# Patient Record
Sex: FEMALE | Race: WHITE | NOT HISPANIC OR LATINO | Employment: FULL TIME | URBAN - METROPOLITAN AREA
[De-identification: names, ages, dates, MRNs, and addresses within clinical notes are randomized per-mention and may not be internally consistent; named-entity substitution may affect disease eponyms.]

---

## 2021-01-13 ENCOUNTER — OFFICE VISIT (OUTPATIENT)
Dept: NEUROSURGERY | Facility: CLINIC | Age: 26
End: 2021-01-13

## 2021-01-13 VITALS
WEIGHT: 231.52 LBS | BODY MASS INDEX: 38.57 KG/M2 | SYSTOLIC BLOOD PRESSURE: 116 MMHG | DIASTOLIC BLOOD PRESSURE: 76 MMHG | TEMPERATURE: 97.1 F | HEIGHT: 65 IN

## 2021-01-13 DIAGNOSIS — M51.36 DISC DEGENERATION, LUMBAR: Primary | ICD-10-CM

## 2021-01-13 DIAGNOSIS — M54.9 MECHANICAL BACK PAIN: ICD-10-CM

## 2021-01-13 PROCEDURE — 99203 OFFICE O/P NEW LOW 30 MIN: CPT | Performed by: PHYSICIAN ASSISTANT

## 2021-01-13 RX ORDER — SPIRONOLACTONE 100 MG/1
100 TABLET, FILM COATED ORAL DAILY
COMMUNITY

## 2021-01-13 RX ORDER — PRENATAL VIT NO.126/IRON/FOLIC 28MG-0.8MG
TABLET ORAL DAILY
COMMUNITY

## 2021-01-13 NOTE — PROGRESS NOTES
Patient: Henny Carroll  : 1995  Chart #: [id number]    Date of Service: 2021    CHIEF COMPLAINT: Low back pain    History of Present Illness Ms. Carroll is a 25-year-old  who is new to our clinic.  She describes a couple year history of on and off low back pain.  In September of last year symptoms came on and have become more constant and bothersome.  Pain is confined to the low back and sometimes radiates off to the left side.  She denies radicular type complaints.  She was told that she had a fractured vertebral bone at one point that has now healed.  Nothing she can think of makes symptoms better or worse.  She has had no formal therapies.  Occasionally she takes over-the-counter analgesics.  No bowel or bladder difficulties.  She was previously evaluated by an orthopedic provider for her low back difficulties and that did not go over very well.      Past Medical History:   Diagnosis Date   • Anemia    • Bladder infection    • Headache    • Low back pain          Current Outpatient Medications:   •  AZO CRANBERRY GUMMIES PO, Take  by mouth., Disp: , Rfl:   •  prenatal vitamin (prenatal, CLASSIC, vitamin) tablet, Take  by mouth Daily., Disp: , Rfl:   •  spironolactone (ALDACTONE) 100 MG tablet, Take 100 mg by mouth Daily., Disp: , Rfl:     Past Surgical History:   Procedure Laterality Date   • ARM LACERATION REPAIR Right    • CYST REMOVAL         Social History     Socioeconomic History   • Marital status: Single     Spouse name: Not on file   • Number of children: Not on file   • Years of education: Not on file   • Highest education level: Not on file   Tobacco Use   • Smoking status: Never Smoker   • Smokeless tobacco: Never Used   Substance and Sexual Activity   • Alcohol use: Yes     Alcohol/week: 1.0 standard drinks     Types: 1 Glasses of wine per week     Comment: 1 drink every other week   • Drug use: Never   • Sexual activity: Defer         Review of Systems   Constitutional:  Positive for activity change, fatigue and unexpected weight change. Negative for appetite change, chills, diaphoresis and fever.   HENT: Negative for congestion, dental problem, drooling, ear discharge, ear pain, facial swelling, hearing loss, mouth sores, nosebleeds, postnasal drip, rhinorrhea, sinus pressure, sneezing, sore throat, tinnitus, trouble swallowing and voice change.    Eyes: Negative for photophobia, pain, discharge, redness, itching and visual disturbance.   Respiratory: Positive for apnea. Negative for cough, choking, chest tightness, shortness of breath, wheezing and stridor.    Cardiovascular: Negative for chest pain, palpitations and leg swelling.   Gastrointestinal: Positive for constipation. Negative for abdominal distention, abdominal pain, anal bleeding, blood in stool, diarrhea, nausea, rectal pain and vomiting.   Endocrine: Negative for cold intolerance, heat intolerance, polydipsia, polyphagia and polyuria.   Genitourinary: Negative for decreased urine volume, difficulty urinating, dysuria, enuresis, flank pain, frequency, genital sores, hematuria and urgency.   Musculoskeletal: Positive for back pain. Negative for arthralgias, gait problem, joint swelling, myalgias, neck pain and neck stiffness.   Skin: Negative for color change, pallor, rash and wound.   Allergic/Immunologic: Negative for environmental allergies, food allergies and immunocompromised state.   Neurological: Positive for headaches. Negative for dizziness, tremors, seizures, syncope, facial asymmetry, speech difficulty, weakness, light-headedness and numbness.   Hematological: Negative for adenopathy. Does not bruise/bleed easily.   Psychiatric/Behavioral: Negative for agitation, behavioral problems, confusion, decreased concentration, dysphoric mood, hallucinations, self-injury, sleep disturbance and suicidal ideas. The patient is not nervous/anxious and is not hyperactive.    All other systems reviewed and are  "negative.      Objective   Vital Signs: Blood pressure 116/76, temperature 97.1 °F (36.2 °C), temperature source Infrared, height 165.1 cm (65\"), weight 105 kg (231 lb 8.3 oz).  Physical Exam  Vitals signs and nursing note reviewed.   Constitutional:       General: She is not in acute distress.     Appearance: She is well-developed.   HENT:      Head: Normocephalic and atraumatic.   Eyes:      Pupils: Pupils are equal, round, and reactive to light.   Cardiovascular:      Heart sounds: Normal heart sounds.   Pulmonary:      Breath sounds: Normal breath sounds.   Psychiatric:         Behavior: Behavior normal.         Thought Content: Thought content normal.     Musculoskeletal:     Strength is intact in upper and lower extremities to direct testing.     Station and gait are normal.     Straight leg raising is negative.   Neurologic:     Muscle tone is normal throughout.     Coordination is intact.     Deep tendon reflexes: 1+ and symmetrical.     Sensation is intact to light touch throughout.     Patient is oriented to person, place, and time.         Independent review of radiographic imaging: MRI of the lumbar spine demonstrates some disc desiccation with annular fissure at l3-4 as well as some fluid within the facet joints. No significant neural foraminal narrowing or central stenosis.  No fractures.  Also reviewed by Dr. Bañuelos    Assessment/Plan   Diagnosis: Mechanical low back pain in the setting of mild degenerative disc disease    Medical Decision Making: I referred Ms. Carroll for formal physical therapy.  We had a long conversation about core strengthening, daily exercises, and weight loss.  We also discussed proper lifting techniques as well as posture.  She is very young to already be having low back difficulties but can make small adjustments that will certainly help long-term. Follow up in our clinic as needed.       Diagnoses and all orders for this visit:    1. Disc degeneration, lumbar (Primary)  -   "   Ambulatory Referral to Physical Therapy Evaluate and treat; Electrotherapy, Heat; Tens (Home); Soft Tissue Mobilizaton    2. Mechanical back pain  -     Ambulatory Referral to Physical Therapy Evaluate and treat; Electrotherapy, Heat; Tens (Home); Soft Tissue Mobilizaton                        Patient's Body mass index is 38.53 kg/m². BMI is above normal parameters. Recommendations include: exercise counseling and nutrition counseling.         Claudette Fortune PA-C  Patient Care Team:  Marquez Lr APRN as PCP - General (Nurse Practitioner)